# Patient Record
(demographics unavailable — no encounter records)

---

## 2024-11-22 NOTE — IMAGING
[Severe arthritis (Tonnis Grade 3)] : Severe arthritis (Tonnis Grade 3) [Femoral head collapse] : Femoral head collapse [Left] : left knee [AP] : anteroposterior [Lateral] : lateral [[place]] : skyline [advanced tricompartmental OA with lateral compartment narrowing and valgus alignment] : advanced tricompartmental OA with lateral compartment narrowing and valgus alignment [de-identified] : Left knee: No effusion.  No tenderness.  ROM 3-130.  Lig stabe.  NVI.  Walks without assistance  Left hip: No swelling.  No tenderness.  Limited IR.  Knee pain with hip rotation.  NVI.

## 2024-11-22 NOTE — ASSESSMENT
[FreeTextEntry1] : 11/22/24: Mod/adv OA left knee, did not respond to conservative treatments.  His left hip has poor ROM with head collapse and adv OA.  While he has OA left knee he has a lot of referred pain from hip as well and his only surgical option is to do left ELIAS prior to left ELIAS.  He has no medical hx and wishes to have this done at the surgery center.  CT left hip eval bone loss.  Discussed leg length discrepancies due to OA in hips and knees and he may feel longer on left side after ELIAS is done initially.

## 2024-11-22 NOTE — HISTORY OF PRESENT ILLNESS
[de-identified] : Left knee pain for a few years, worsening.  No relief with PT and injections.  Pain daily, difficulty walking due to knee pain. [] : no [FreeTextEntry5] : left knee pain for couple months, no injury. Seen [facility] 2024, stated bone on bone. Has tried ha and csi inj with no relief.  Having medial pain.  Left knee Arthroscopic surgery 1990 meniscus

## 2024-11-22 NOTE — DISCUSSION/SUMMARY
[de-identified] : The natural progression of Osteoarthritis was explained to the patient.  We discussed the possible treatment options from conservative to operative.  These included NSAIDS, Glucosamine and Chondroitin sulfate, and Physical Therapy as well different types of injections.  We also discussed that at some point they may progress to needing a ELIAS.  Information and pamphlets were given when appropriate.   Patient Complains of pain in Hip with a level that often reaches greater than a 8/10. The Pain has been progressively worsening of his/her treatment course. The pain has interfered with their ADLs and worsens with weight bearing. On exam pain worsens with ROM passive and active and I measured a limited ROM.   X-rays were reviewed with the patient, and they show joint space narrowing, subchondral sclerosis, osteophyte formation, and subchondral cysts.   After a period of more than 12 weeks physical therapy or exercise program done with me or another treating physician, they have continued pain. The patient has failed a trial of NSAID medication or pain relievers if they were unable to tolerate NSAID medications. After a long discussion with the patient both the patient and I have decided we have exhausted all forms of less radical treatments, and they would like to proceed with Total Hip Replacement.   We discussed my findings and the natural history of their condition.  We talked about the details of the proposed surgery and the recovery.  We discussed the material risks, possible benefits and alternatives to surgery.  The risks include but are not limited to infection, bleeding and possible need for blood transfusion, fracture, bowel blockage, bladder retention or infection, need for reoperation, stiffness and/or limited range of motion, possible damage to nerves and blood vessels, failure of fixation of components, risk of deep vein thromboses and pulmonary embolism, wound healing problems, dislocation, and possible leg length discrepancy.  Although incredibly rare, we also discussed the risks of a cardiac event, stroke and even death during, or following, the surgery.  We discussed the type of implants the patient will be receiving and the type of fixation that will be used, as well as whether a robot or computer navigation aide will be used.  The patient understands they will need medical clearance and will attend a preoperative joint education class.  We also discussed the type of anesthesia they will receive, and the risks associated with hospital or rehab length of stay, obesity, diabetes and smoking.

## 2025-04-30 NOTE — HISTORY OF PRESENT ILLNESS
[Good (7-10 METs)] : Good (7-10 METs) [(Patient denies any chest pain, claudication, dyspnea on exertion, orthopnea, palpitations or syncope)] : Patient denies any chest pain, claudication, dyspnea on exertion, orthopnea, palpitations or syncope [Aortic Stenosis] : no aortic stenosis [Atrial Fibrillation] : no atrial fibrillation [Coronary Artery Disease] : no coronary artery disease [Recent Myocardial Infarction] : no recent myocardial infarction [Implantable Device/Pacemaker] : no implantable device/pacemaker [Asthma] : no asthma [COPD] : no COPD [Sleep Apnea] : no sleep apnea [Smoker] : not a smoker [Family Member] : no family member with adverse anesthesia reaction/sudden death [Self] : no previous adverse anesthesia reaction [Chronic Anticoagulation] : no chronic anticoagulation [Chronic Kidney Disease] : no chronic kidney disease [FreeTextEntry2] : 5/9 [FreeTextEntry4] : 73 yo M who has not seen a PCP or any doctors in years here today for preoperative examination prior to planned L THR. 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 Says he had a physical, which he passed. He started to go to the [employer] and had to stop because of his joint pain. He states he is anxious about going to the doctor and says that is why his BP is so high 177/72.  He has a history of "tearing some cartilage" in his left knee 30 years ago playing tennis. 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  Has no complaints. Denies CP, SOB, NICK, headache, dizziness, abdominal pain, N/V/C/D.  Says prior to hips hurting he would exercise several times a week with weight training and cardiovascular exercise. He eats egg whites for breakfast, does not eat lunch. Eats a veggie burger/fish/potato for dinner. Says his weight has been stable for years, but when his daughter was young, his weight was above 240 lbs due to unhealthy eating habits.  Patient used to smoke a pipe, quit in January 2025. He smoked for >50 years.

## 2025-04-30 NOTE — ASSESSMENT
[Patient NOT optimized for Surgery at this time] : Patient not optimized for surgery at this time [Cardiology consultation] : Cardiology consultation [FreeTextEntry4] : 75 yo M who is here for preoperative evaluation prior to L THR. 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 His BP in the office today is significantly elevated, which he thinks might be due to anxiety. By history, he has good functional capacity and is asymptomatic, but without further assessment of his underlying medical condition and better BP control, he is not deemed medically optimized.

## 2025-04-30 NOTE — PHYSICAL EXAM
[No Acute Distress] : no acute distress [No Lymphadenopathy] : no lymphadenopathy [Thyroid Normal, No Nodules] : the thyroid was normal and there were no nodules present [Regular Rhythm] : with a regular rhythm [No Edema] : there was no peripheral edema [Normal] : soft, non-tender, non-distended, no masses palpated, no HSM and normal bowel sounds [Normal Posterior Cervical Nodes] : no posterior cervical lymphadenopathy [Normal Anterior Cervical Nodes] : no anterior cervical lymphadenopathy [No Focal Deficits] : no focal deficits [Alert and Oriented x3] : oriented to person, place, and time [de-identified] : +2 systolic murmur best heard at right 2nd ICS [de-identified] : patient is anxious appearing

## 2025-05-05 NOTE — ASSESSMENT
[FreeTextEntry1] : This patient is low risk from a cardiac perspective for an intermediate risk surgical procedure. No clinical markers of increased risk and no further testing is warranted.   Regarding his BP he will obtain a monitor and log his readings at home when not in a medical situation. If consistently elevated, he will call me and we will address. I suspect it is related to medical anxiety.   Regarding ?WPW. This patient had previously seen a [facility] cardiologist that felt this was of no concern. I do not see this on his ECG today. It is clinically no concern.   ROV as needed.

## 2025-05-05 NOTE — HISTORY OF PRESENT ILLNESS
[FreeTextEntry1] : Corey is a very intelligent 75 yo male with DJD of the left hip and planned surgical correction. He presents due to fairly evident "white coat HTN". He admits to no significant medical issues, but an extreme and unfounded anxiety when his BP is checked. This is confirmed in the office today as he physically shakes when I took his BP (140's systolic). He is active and without cardiac symptoms or limitations. He eats a healthy diet and exercised regularly until his hip stopped him. No angina or equivalent.  There is some question regarding WPW. I do not see this pattern on my ECG today nor the one obtained in his PCP office. This, at 74, with no symptoms or palpitations, is of little concern. It should not wax and wane between ECG's.

## 2025-05-20 NOTE — DISCUSSION/SUMMARY
[de-identified] : The patient is doing well at this time. The patient will be started on a course of physical therapy. I recommended that the patient works on range of motion at home and was shown how to do this. I encouraged the patient to increase ambulation. The patient can continue to take Tylenol for occasional discomfort. The patient was advised not to do any dental work for the first three months following the surgery. We will see the patient  back for a follow-up for a repeat evaluation. The patient  will call or return earlier for any questions or concerns.  Signs and symptoms of infection reviewed and patient advised to call immediately for redness, fevers, and/or chills.  I saw the patient under the supervision of Dr. Pradhan and followed his plan of care.

## 2025-05-20 NOTE — PHYSICAL EXAM
[de-identified] : Left hip: min swelling.  Inc c/d/i.  NVI.  Walks without assistance. [Left] : left hip with pelvis [AP] : anteroposterior [Lateral] : lateral [Components well fixed, in good position] : Components well fixed, in good position

## 2025-05-20 NOTE — ASSESSMENT
[FreeTextEntry1] : Previous doc: 11/22/24: Mod/adv OA left knee, did not respond to conservative treatments.  His left hip has poor ROM with head collapse and adv OA.  While he has OA left knee he has a lot of referred pain from hip as well and his only surgical option is to do left ELIAS prior to left ELIAS.  He has no medical hx and wishes to have this done at the surgery center.  CT left hip eval bone loss.  Discussed leg length discrepancies due to OA in hips and knees and he may feel longer on left side after ELIAS is done initially.  5/20/25: 2 weeks psotop doing very well, min pain.  Knee pain resolved.  Cont PT, return in 6 weeks.

## 2025-05-20 NOTE — HISTORY OF PRESENT ILLNESS
[de-identified] : 5/20/25: 2 weeks s/p left ELIAS, no pain, no fevers/chills.  Knee pain resolved.  Stopped cane.  Previous doc: Left knee pain for a few years, worsening.  No relief with PT and injections.  Pain daily, difficulty walking due to knee pain.

## 2025-05-20 NOTE — IMAGING
[Severe arthritis (Tonnis Grade 3)] : Severe arthritis (Tonnis Grade 3) [Femoral head collapse] : Femoral head collapse [Left] : left knee [AP] : anteroposterior [Lateral] : lateral [[place]] : skyline [advanced tricompartmental OA with lateral compartment narrowing and valgus alignment] : advanced tricompartmental OA with lateral compartment narrowing and valgus alignment

## 2025-07-01 NOTE — PHYSICAL EXAM
[Left] : left hip with pelvis [AP] : anteroposterior [Lateral] : lateral [Components well fixed, in good position] : Components well fixed, in good position [de-identified] : Left hip: No swelling.  Inc healed.  5/5 flex strength.  No pain with ROM.  NVI.  Walks without assistance.

## 2025-07-01 NOTE — DISCUSSION/SUMMARY
[de-identified] : The patient was advised of the diagnosis.  The natural history of the pathology was explained in full to the patient in layman's terms. All questions were answered.  The risks and benefits of surgical and non-surgical treatment alternatives were explained in full to the patient.  I saw the patient under the supervision of Dr. Pradhan and followed his plan of care.

## 2025-07-01 NOTE — HISTORY OF PRESENT ILLNESS
[] : Post Surgical Visit: yes [de-identified] : 7/1/25: 2 months postop no pain, doing HEP at the gym.  No fevers/chills.  Previous doc: Left knee pain for a few years, worsening.  No relief with PT and injections.  Pain daily, difficulty walking due to knee pain. 5/20/25: 2 weeks s/p left ELIAS, no pain, no fevers/chills.  Knee pain resolved.  Stopped cane. [de-identified] : HEP [de-identified] : L ELIAS

## 2025-07-01 NOTE — ASSESSMENT
[FreeTextEntry1] : Previous doc: 11/22/24: Mod/adv OA left knee, did not respond to conservative treatments.  His left hip has poor ROM with head collapse and adv OA.  While he has OA left knee he has a lot of referred pain from hip as well and his only surgical option is to do left ELIAS prior to left ELIAS.  He has no medical hx and wishes to have this done at the surgery center.  CT left hip eval bone loss.  Discussed leg length discrepancies due to OA in hips and knees and he may feel longer on left side after ELIAS is done initially. 5/20/25: 2 weeks psotop doing very well, min pain.  Knee pain resolved.  Cont PT, return in 6 weeks.  7/1/25: 2 months postop doing very well, no pain, cont HEP, abx for dentist and return at 1 year postop.  He is very happy with outcome.